# Patient Record
Sex: MALE | Race: WHITE | NOT HISPANIC OR LATINO | Employment: OTHER | URBAN - METROPOLITAN AREA
[De-identification: names, ages, dates, MRNs, and addresses within clinical notes are randomized per-mention and may not be internally consistent; named-entity substitution may affect disease eponyms.]

---

## 2020-12-22 ENCOUNTER — TELEPHONE (OUTPATIENT)
Dept: FAMILY MEDICINE | Facility: CLINIC | Age: 60
End: 2020-12-22

## 2020-12-22 NOTE — TELEPHONE ENCOUNTER
----- Message from Su Christian sent at 12/21/2020  2:18 PM CST -----  Contact: DIAMOND COLIN [[id number]] @ [phone]  Would like to get medical advice.  Symptoms (please be specific):  Pink Dot; Congestion, coughn runnynose   How long has patient had these symptoms:  X 3-4 days / caught it from his son  Pharmacy name and phone # (copy from chart):  [facility]  [phone]      Comments:  Please call him today

## 2020-12-24 ENCOUNTER — OFFICE VISIT (OUTPATIENT)
Dept: FAMILY MEDICINE | Facility: CLINIC | Age: 60
End: 2020-12-24
Payer: MEDICARE

## 2020-12-24 VITALS
DIASTOLIC BLOOD PRESSURE: 68 MMHG | BODY MASS INDEX: 21.23 KG/M2 | TEMPERATURE: 97 F | OXYGEN SATURATION: 98 % | HEIGHT: 74 IN | WEIGHT: 165.38 LBS | HEART RATE: 92 BPM | SYSTOLIC BLOOD PRESSURE: 102 MMHG | RESPIRATION RATE: 18 BRPM

## 2020-12-24 DIAGNOSIS — Z90.81 S/P SPLENECTOMY: ICD-10-CM

## 2020-12-24 DIAGNOSIS — Z98.890 HISTORY OF ILEOSTOMY: ICD-10-CM

## 2020-12-24 DIAGNOSIS — R05.9 COUGH: ICD-10-CM

## 2020-12-24 DIAGNOSIS — K50.119 CROHN'S DISEASE OF COLON WITH COMPLICATION: ICD-10-CM

## 2020-12-24 DIAGNOSIS — J01.10 ACUTE FRONTAL SINUSITIS, RECURRENCE NOT SPECIFIED: Primary | ICD-10-CM

## 2020-12-24 PROBLEM — G47.09 OTHER INSOMNIA: Status: ACTIVE | Noted: 2020-12-24

## 2020-12-24 PROBLEM — K21.9 GERD (GASTROESOPHAGEAL REFLUX DISEASE): Status: ACTIVE | Noted: 2020-12-24

## 2020-12-24 PROBLEM — M54.2 CHRONIC NECK PAIN: Status: ACTIVE | Noted: 2020-12-24

## 2020-12-24 PROBLEM — K44.9 HIATAL HERNIA: Status: ACTIVE | Noted: 2020-12-24

## 2020-12-24 PROBLEM — G89.29 CHRONIC NECK PAIN: Status: ACTIVE | Noted: 2020-12-24

## 2020-12-24 PROCEDURE — 96372 THER/PROPH/DIAG INJ SC/IM: CPT | Mod: S$GLB,,, | Performed by: FAMILY MEDICINE

## 2020-12-24 PROCEDURE — 99203 PR OFFICE/OUTPT VISIT, NEW, LEVL III, 30-44 MIN: ICD-10-PCS | Mod: 25,S$GLB,, | Performed by: FAMILY MEDICINE

## 2020-12-24 PROCEDURE — 96372 PR INJECTION,THERAP/PROPH/DIAG2ST, IM OR SUBCUT: ICD-10-PCS | Mod: S$GLB,,, | Performed by: FAMILY MEDICINE

## 2020-12-24 PROCEDURE — 99203 OFFICE O/P NEW LOW 30 MIN: CPT | Mod: 25,S$GLB,, | Performed by: FAMILY MEDICINE

## 2020-12-24 RX ORDER — BUPRENORPHINE HYDROCHLORIDE 8 MG/1
TABLET SUBLINGUAL
COMMUNITY
Start: 2020-10-27 | End: 2021-06-23

## 2020-12-24 RX ORDER — TIZANIDINE 2 MG/1
1 TABLET ORAL EVERY 6 HOURS PRN
COMMUNITY
Start: 2020-11-30

## 2020-12-24 RX ORDER — AMOXICILLIN AND CLAVULANATE POTASSIUM 875; 125 MG/1; MG/1
1 TABLET, FILM COATED ORAL EVERY 12 HOURS
Qty: 14 TABLET | Refills: 0 | Status: SHIPPED | OUTPATIENT
Start: 2020-12-24 | End: 2020-12-31

## 2020-12-24 RX ORDER — DICLOFENAC SODIUM 10 MG/G
GEL TOPICAL
COMMUNITY
Start: 2020-11-30

## 2020-12-24 RX ORDER — BETAMETHASONE SODIUM PHOSPHATE AND BETAMETHASONE ACETATE 3; 3 MG/ML; MG/ML
6 INJECTION, SUSPENSION INTRA-ARTICULAR; INTRALESIONAL; INTRAMUSCULAR; SOFT TISSUE ONCE
Status: COMPLETED | OUTPATIENT
Start: 2020-12-24 | End: 2020-12-24

## 2020-12-24 RX ORDER — GABAPENTIN 300 MG/1
2 CAPSULE ORAL 3 TIMES DAILY PRN
COMMUNITY
Start: 2020-11-30

## 2020-12-24 RX ORDER — MULTIVIT-MIN/FA/LYCOPEN/LUTEIN .4-300-25
TABLET ORAL
COMMUNITY

## 2020-12-24 RX ADMIN — BETAMETHASONE SODIUM PHOSPHATE AND BETAMETHASONE ACETATE 6 MG: 3; 3 INJECTION, SUSPENSION INTRA-ARTICULAR; INTRALESIONAL; INTRAMUSCULAR; SOFT TISSUE at 12:12

## 2020-12-24 NOTE — PATIENT INSTRUCTIONS
Self-Care for Sinusitis     Drinking plenty of water can help sinuses drain.   Sinusitis can often be managed with self-care. Self-care can keep sinuses moist and make you feel more comfortable. Remember to follow your doctor's instructions closely. This can make a big difference in getting your sinus problem under control.  Drink fluids  Drinking extra fluids helps thin your mucus. This lets it drain from your sinuses more easily. Have a glass of water every hour or two. A humidifier helps in much the same way. Fluids can also offset the drying effects of certain medicines. If you use a humidifier, follow the product maker's instructions on how to use it. Clean it on a regular schedule.  Use saltwater rinses  Rinses help keep your sinuses and nose moist. Mix a teaspoon of salt in 8 ounces of fresh, warm water. Use a bulb syringe to gently squirt the water into your nose a few times a day. You can also buy ready-made saline nasal sprays.  Apply hot or cold packs  Applying heat to the area surrounding your sinuses may make you feel more comfortable. Use a hot water bottle or a hand towel dipped in hot water. Some people also find ice packs effective for relieving pain.  Medicines  Your doctor may prescribe medications to help treat your sinusitis. If you have an infection, antibiotics can help clear it up. If you are prescribed antibiotics, take all pills on schedule until they are gone, even if you feel better. Decongestants help relieve swelling. Use decongestant sprays for short periods only under the direction of your doctor. If you have allergies, your doctor may prescribe medications to help relieve them.   Date Last Reviewed: 10/1/2016  © 5646-5224 [employer]. [address] [place] [address]. All rights reserved. This information is not intended as a substitute for professional medical care. Always follow your healthcare professional's instructions.        Sinusitis, Antibiotic  Treatment (Child)  The sinuses are air-filled spaces in the skull. They are behind the forehead, in the nasal bones and cheeks, and around the eyes. When sinuses are healthy, air moves freely and mucus drains. When a child has a cold or an allergy, the lining of the nose and sinuses can become swollen. Mucus can become trapped. Bacteria may then multiply, causing bacterial sinusitis. This is also called a sinus infection.  Sinusitis often starts with a cold. Cold symptoms usually go away in 5 or 10 days. If sinusitis develops, the symptoms continue and may even get worse. Thick, yellow-green mucus may drain from the nose. Your child may cough more. Your child may also have bad breath that doesnt go away. Other symptoms may include pain or swelling in the face, sore throat, or headache.  The health care provider has prescribed antibiotics to treat the bacterial infection. Symptoms usually get better 2 to 3 days after your child starts the medicine.  Home care  Follow these guidelines when caring for your child at home:  · The health care provider has prescribed an oral antibiotic for your child. This is to help stop the infection. Follow all instructions for giving this medicine to your child. Make sure your child takes the medication every day until it is gone. You should not have any left over. You may also be told to use saline nasal drops or a decongestant.  · If your child has pain, give him or her pain medicine as advised by your childs provider. Don't give your child aspirin unless told to do so. Don't give your child any other medicine without first asking the provider.  · Give your child plenty of time to rest. Try to make your child as comfortable as possible. Some children may be distracted by quiet activities.  · Encourage your child to drink liquids. Toddlers or older children may prefer cold drinks, frozen desserts, or popsicles. They may also like warm chicken soup or beverages with lemon and honey.  Don't give honey to children younger than 1 year old.  · Use a cool-mist humidifier in your childs bedroom to make breathing easier, especially at night. Clean and dry the humidifier to keep bacteria and mold from growing. Dont use using a hot water vaporizer. It can cause burns.  · Dont smoke around your child. Tobacco smoke can make your childs symptoms worse.  Follow-up care  Follow up with your childs healthcare provider, or as directed.  When to seek medical advice  Unless advised otherwise, call your child's healthcare provider if:  · Your child is 3 months old or younger and has a fever of 100.4°F (38°C) or higher. Your child may need to see a healthcare provider.  · Your child is of any age and has fevers higher than 104°F (40°C) that come back again and again.  Call your child's provider right away if your child has any of these:  · Swelling or redness around eyes that lasts all day, not just in the morning  · Vomiting that continues  · Sensitivity to light  · Irritability that gets worse  · Sudden or severe pain in face or head  · Double vision  · Not acting right or not thinking clearly  · Stiff neck  · Breathing problems  · Symptoms not going away in 10 days  Date Last Reviewed: 4/13/2015  © 6214-0791 [employer]. [address] [place] [address], [place] [address]. All rights reserved. This information is not intended as a substitute for professional medical care. Always follow your healthcare professional's instructions.        Acute Sinusitis    Acute sinusitis is irritation and swelling of the sinuses. It is usually caused by a viral infection after a common cold. Your doctor can help you find relief.  What is acute sinusitis?  Sinuses are air-filled spaces in the skull behind the face. They are kept moist and clean by a lining of mucosa. Things such as pollen, smoke, and chemical fumes can irritate the mucosa. It can then swell up. As a response to irritation, the mucosa makes more mucus  and other fluids. Tiny hairlike cilia cover the mucosa. Cilia help carry mucus toward the opening of the sinus. Too much mucus may cause the cilia to stop working. This blocks the sinus opening. A buildup of fluid in the sinuses then causes pain and pressure. It can also encourage bacteria to grow in the sinuses.  Common symptoms of acute sinusitis  You may have:  · Facial soreness pain  · Headache  · Fever  · Fluid draining in the back of the throat (postnasal drip)  · Congestion  · Drainage that is thick and colored, instead of clear  · Cough  Diagnosing acute sinusitis  Your doctor will ask about your symptoms and health history. He or she will look at your ear, nose, and throat. You usually won't need to have X-rays taken.    The doctor may take a sample of mucus to check for bacteria. If you have sinusitis that keeps coming back, you may need imaging tests such as X-rays or CAT scans. This will help your doctor check for a structural problem that may be causing the infection.  Treating acute sinusitis  Treatment is aimed at unblocking the sinus opening and helping the cilia work again. You may need to take antihistamine and decongestant medicine. These can reduce inflammation and decrease the amount of fluid your sinuses make. If you have a bacterial infection, you will need to take antibiotic medicine for 10 to 14 days. Take this medicine until it is gone, even if you feel better.  Date Last Reviewed: 10/1/2016  © 2906-9286 [employer]. [address] [place] [address], [place] [address]. All rights reserved. This information is not intended as a substitute for professional medical care. Always follow your healthcare professional's instructions.

## 2020-12-24 NOTE — PROGRESS NOTES
Subjective:       Patient ID: Bruno Ryan is a 60 y.o. male.    Chief Complaint: Cough, Nasal Congestion, and Sore Throat    This patient is new to me and new to the clinic.  Mr Rivera presents tot he clinic today with complaints of sinus congestion and pain for the last week. Patient reports due to his decreased immune system from his splenectomy he did not want to come in and be exposed to COVID. Patient states he has been trying to treat this at home but now has sinus tenderness and cough at night.   Patient educated on plan of care, verbalized understanding.     Review of Systems   Constitutional: Negative for activity change, appetite change, chills, diaphoresis and fever.   HENT: Positive for postnasal drip, sinus pressure and sore throat. Negative for congestion, ear pain and sneezing.    Eyes: Negative for pain, discharge, redness and itching.   Respiratory: Positive for cough. Negative for apnea, chest tightness, shortness of breath and wheezing.    Cardiovascular: Negative for chest pain and leg swelling.   Gastrointestinal: Negative for abdominal distention, abdominal pain, constipation, diarrhea, nausea and vomiting.   Genitourinary: Negative for difficulty urinating, dysuria, flank pain and frequency.   Skin: Negative for color change, rash and wound.   Neurological: Negative for dizziness.       Patient Active Problem List   Diagnosis    Crohn's disease of colon with complication    GERD (gastroesophageal reflux disease)    Other insomnia    Chronic neck pain    Hiatal hernia    History of ileostomy    S/P splenectomy       Objective:      Physical Exam  Vitals signs reviewed.   Constitutional:       General: He is not in acute distress.     Appearance: Normal appearance. He is well-developed.   HENT:      Head: Normocephalic.      Right Ear: Ear canal and external ear normal. Tympanic membrane is perforated.      Left Ear: Ear canal and external ear normal. A middle ear effusion is present.       Ears:      Comments: Patient has history of perforated TM on right and multiple surgeries.     Nose:      Right Sinus: Maxillary sinus tenderness and frontal sinus tenderness present.      Left Sinus: Maxillary sinus tenderness and frontal sinus tenderness present.      Mouth/Throat:      Lips: Pink.      Mouth: Mucous membranes are moist.      Pharynx: Posterior oropharyngeal erythema present. No oropharyngeal exudate.      Tonsils: 0 on the right. 0 on the left.   Eyes:      Conjunctiva/sclera: Conjunctivae normal.      Pupils: Pupils are equal, round, and reactive to light.   Neck:      Musculoskeletal: Normal range of motion and neck supple.   Cardiovascular:      Rate and Rhythm: Normal rate and regular rhythm.      Heart sounds: Normal heart sounds.   Pulmonary:      Effort: Pulmonary effort is normal. No respiratory distress.      Breath sounds: Normal breath sounds. No stridor. No wheezing, rhonchi or rales.   Skin:     General: Skin is warm and dry.      Findings: No rash.   Neurological:      Mental Status: He is alert and oriented to person, place, and time.   Psychiatric:         Mood and Affect: Mood normal.         Behavior: Behavior normal.         No results found for: WBC, HGB, HCT, PLT, CHOL, TRIG, HDL, LDLDIRECT, ALT, AST, NA, K, CL, CREATININE, BUN, CO2, TSH, PSA, INR, GLUF, HGBA1C, MICROALBUR  The ASCVD Risk score (Gina DC Jr., et al., 2013) failed to calculate for the following reasons:    Cannot find a previous HDL lab    Cannot find a previous total cholesterol lab    Assessment:       1. Acute frontal sinusitis, recurrence not specified    2. Cough    3. History of ileostomy    4. Crohn's disease of colon with complication    5. S/P splenectomy    6. BMI 21.0-21.9, adult        Plan:       Bruno was seen today for cough, nasal congestion and sore throat.    Diagnoses and all orders for this visit:    Acute frontal sinusitis, recurrence not specified  -     amoxicillin-clavulanate  875-125mg (AUGMENTIN) 875-125 mg per tablet; Take 1 tablet by mouth every 12 (twelve) hours. for 7 days  -     betamethasone acetate-betamethasone sodium phosphate injection 6 mg    Cough  -     betamethasone acetate-betamethasone sodium phosphate injection 6 mg    History of ileostomy / Crohn's disease of colon with complication  Stable  Continue medications as prescribed.  Follow up with PCP    S/P splenectomy  Stable  Continue medications as prescribed.  Follow up with PCP    BMI 21.0-21.9  Body mass index is 21.23 kg/m².  Continue healthy diet and regular exercise as tolerated.  Continue medications as prescribed.  Follow up with PCP    Follow up if symptoms worsen or fail to improve.    Dr James Morton (pain management in [place], [place])

## 2021-04-30 ENCOUNTER — OFFICE VISIT (OUTPATIENT)
Dept: URGENT CARE | Facility: CLINIC | Age: 61
End: 2021-04-30
Payer: MEDICARE

## 2021-04-30 VITALS
BODY MASS INDEX: 19.38 KG/M2 | OXYGEN SATURATION: 99 % | HEIGHT: 74 IN | TEMPERATURE: 97 F | DIASTOLIC BLOOD PRESSURE: 73 MMHG | WEIGHT: 151 LBS | HEART RATE: 80 BPM | SYSTOLIC BLOOD PRESSURE: 107 MMHG

## 2021-04-30 DIAGNOSIS — H92.01 OTALGIA, RIGHT: ICD-10-CM

## 2021-04-30 DIAGNOSIS — H66.91 RIGHT OTITIS MEDIA, UNSPECIFIED OTITIS MEDIA TYPE: Primary | ICD-10-CM

## 2021-04-30 PROCEDURE — 99204 OFFICE O/P NEW MOD 45 MIN: CPT | Mod: S$GLB,,, | Performed by: EMERGENCY MEDICINE

## 2021-04-30 PROCEDURE — 99204 PR OFFICE/OUTPT VISIT, NEW, LEVL IV, 45-59 MIN: ICD-10-PCS | Mod: S$GLB,,, | Performed by: EMERGENCY MEDICINE

## 2021-04-30 RX ORDER — NEOMYCIN SULFATE, POLYMYXIN B SULFATE AND HYDROCORTISONE 10; 3.5; 1 MG/ML; MG/ML; [USP'U]/ML
3 SUSPENSION/ DROPS AURICULAR (OTIC) 3 TIMES DAILY
Qty: 10 ML | Refills: 1 | Status: SHIPPED | OUTPATIENT
Start: 2021-04-30 | End: 2021-05-07

## 2021-04-30 RX ORDER — AMOXICILLIN 875 MG/1
875 TABLET, FILM COATED ORAL EVERY 12 HOURS
Qty: 20 TABLET | Refills: 0 | Status: SHIPPED | OUTPATIENT
Start: 2021-04-30 | End: 2021-05-10

## 2021-05-14 ENCOUNTER — OFFICE VISIT (OUTPATIENT)
Dept: FAMILY MEDICINE | Facility: CLINIC | Age: 61
End: 2021-05-14
Payer: MEDICARE

## 2021-05-14 VITALS
HEART RATE: 76 BPM | WEIGHT: 153.19 LBS | HEIGHT: 74 IN | OXYGEN SATURATION: 98 % | BODY MASS INDEX: 19.66 KG/M2 | TEMPERATURE: 98 F | SYSTOLIC BLOOD PRESSURE: 112 MMHG | DIASTOLIC BLOOD PRESSURE: 60 MMHG

## 2021-05-14 DIAGNOSIS — L40.9 PSORIASIS: ICD-10-CM

## 2021-05-14 DIAGNOSIS — H66.004 RECURRENT ACUTE SUPPURATIVE OTITIS MEDIA OF RIGHT EAR WITHOUT SPONTANEOUS RUPTURE OF TYMPANIC MEMBRANE: Primary | ICD-10-CM

## 2021-05-14 PROCEDURE — 99214 OFFICE O/P EST MOD 30 MIN: CPT | Mod: S$GLB,,, | Performed by: FAMILY MEDICINE

## 2021-05-14 PROCEDURE — 99214 PR OFFICE/OUTPT VISIT, EST, LEVL IV, 30-39 MIN: ICD-10-PCS | Mod: S$GLB,,, | Performed by: FAMILY MEDICINE

## 2021-05-14 RX ORDER — TRIAMCINOLONE ACETONIDE 1 MG/G
CREAM TOPICAL 2 TIMES DAILY
Qty: 454 G | Refills: 3 | Status: SHIPPED | OUTPATIENT
Start: 2021-05-14

## 2021-05-14 RX ORDER — AMOXICILLIN AND CLAVULANATE POTASSIUM 875; 125 MG/1; MG/1
1 TABLET, FILM COATED ORAL EVERY 12 HOURS
Qty: 20 TABLET | Refills: 0 | Status: SHIPPED | OUTPATIENT
Start: 2021-05-14 | End: 2021-05-24

## 2021-05-14 RX ORDER — OMEPRAZOLE 20 MG/1
CAPSULE, DELAYED RELEASE ORAL
COMMUNITY
End: 2021-06-23 | Stop reason: SDUPTHER

## 2021-05-14 RX ORDER — PREDNISONE 20 MG/1
TABLET ORAL
Qty: 15 TABLET | Refills: 0 | Status: SHIPPED | OUTPATIENT
Start: 2021-05-14 | End: 2021-05-24

## 2021-06-03 ENCOUNTER — TELEPHONE (OUTPATIENT)
Dept: FAMILY MEDICINE | Facility: CLINIC | Age: 61
End: 2021-06-03

## 2021-06-09 ENCOUNTER — HOSPITAL ENCOUNTER (OUTPATIENT)
Dept: RADIOLOGY | Facility: CLINIC | Age: 61
Discharge: HOME | End: 2021-06-09
Attending: FAMILY MEDICINE
Payer: MEDICARE

## 2021-06-09 ENCOUNTER — OFFICE VISIT (OUTPATIENT)
Dept: FAMILY MEDICINE | Facility: CLINIC | Age: 61
End: 2021-06-09
Payer: MEDICARE

## 2021-06-09 VITALS
OXYGEN SATURATION: 98 % | SYSTOLIC BLOOD PRESSURE: 106 MMHG | WEIGHT: 149.81 LBS | HEIGHT: 74 IN | BODY MASS INDEX: 19.23 KG/M2 | TEMPERATURE: 98 F | DIASTOLIC BLOOD PRESSURE: 64 MMHG | HEART RATE: 70 BPM

## 2021-06-09 DIAGNOSIS — Z72.0 TOBACCO USE: ICD-10-CM

## 2021-06-09 DIAGNOSIS — R06.02 SHORTNESS OF BREATH: ICD-10-CM

## 2021-06-09 DIAGNOSIS — F17.210 SMOKES LESS THAN 1 PACK A DAY WITH GREATER THAN 40 PACK YEAR HISTORY: ICD-10-CM

## 2021-06-09 DIAGNOSIS — L40.9 PSORIASIS: Primary | ICD-10-CM

## 2021-06-09 PROCEDURE — 71046 X-RAY EXAM CHEST 2 VIEWS: CPT | Mod: S$GLB,,, | Performed by: RADIOLOGY

## 2021-06-09 PROCEDURE — 99214 PR OFFICE/OUTPT VISIT, EST, LEVL IV, 30-39 MIN: ICD-10-PCS | Mod: S$GLB,,, | Performed by: FAMILY MEDICINE

## 2021-06-09 PROCEDURE — 99214 OFFICE O/P EST MOD 30 MIN: CPT | Mod: S$GLB,,, | Performed by: FAMILY MEDICINE

## 2021-06-09 PROCEDURE — 71046 XR CHEST PA AND LATERAL: ICD-10-PCS | Mod: S$GLB,,, | Performed by: RADIOLOGY

## 2021-06-09 RX ORDER — ALBUTEROL SULFATE 90 UG/1
2 AEROSOL, METERED RESPIRATORY (INHALATION) EVERY 6 HOURS PRN
Qty: 18 G | Refills: 0 | Status: SHIPPED | OUTPATIENT
Start: 2021-06-09 | End: 2023-07-07 | Stop reason: SDUPTHER

## 2021-06-09 RX ORDER — ASPIRIN 81 MG/1
TABLET ORAL
COMMUNITY
End: 2021-06-23 | Stop reason: SDUPTHER

## 2021-06-11 ENCOUNTER — TELEPHONE (OUTPATIENT)
Dept: FAMILY MEDICINE | Facility: CLINIC | Age: 61
End: 2021-06-11

## 2021-06-15 ENCOUNTER — PROCEDURE VISIT (OUTPATIENT)
Dept: RESPIRATORY THERAPY | Facility: HOSPITAL | Age: 61
End: 2021-06-15
Attending: FAMILY MEDICINE
Payer: MEDICARE

## 2021-06-15 DIAGNOSIS — Z72.0 TOBACCO USE: ICD-10-CM

## 2021-06-15 DIAGNOSIS — R06.02 SHORTNESS OF BREATH: ICD-10-CM

## 2021-06-15 DIAGNOSIS — F17.210 SMOKES LESS THAN 1 PACK A DAY WITH GREATER THAN 40 PACK YEAR HISTORY: ICD-10-CM

## 2021-06-15 PROCEDURE — 25000242 PHARM REV CODE 250 ALT 637 W/ HCPCS: Performed by: FAMILY MEDICINE

## 2021-06-15 PROCEDURE — 94729 DIFFUSING CAPACITY: CPT

## 2021-06-15 PROCEDURE — 94060 EVALUATION OF WHEEZING: CPT

## 2021-06-15 PROCEDURE — 94760 N-INVAS EAR/PLS OXIMETRY 1: CPT

## 2021-06-15 PROCEDURE — 94727 GAS DIL/WSHOT DETER LNG VOL: CPT

## 2021-06-15 RX ORDER — ALBUTEROL SULFATE 0.83 MG/ML
2.5 SOLUTION RESPIRATORY (INHALATION) ONCE
Status: COMPLETED | OUTPATIENT
Start: 2021-06-15 | End: 2021-06-15

## 2021-06-15 RX ADMIN — ALBUTEROL SULFATE 2.5 MG: 2.5 SOLUTION RESPIRATORY (INHALATION) at 01:06

## 2021-06-23 ENCOUNTER — OFFICE VISIT (OUTPATIENT)
Dept: FAMILY MEDICINE | Facility: CLINIC | Age: 61
End: 2021-06-23
Payer: MEDICARE

## 2021-06-23 VITALS
HEART RATE: 75 BPM | SYSTOLIC BLOOD PRESSURE: 104 MMHG | WEIGHT: 160.81 LBS | HEIGHT: 74 IN | OXYGEN SATURATION: 97 % | DIASTOLIC BLOOD PRESSURE: 62 MMHG | BODY MASS INDEX: 20.64 KG/M2 | TEMPERATURE: 97 F

## 2021-06-23 DIAGNOSIS — R06.02 SHORTNESS OF BREATH: ICD-10-CM

## 2021-06-23 DIAGNOSIS — R05.9 COUGH: ICD-10-CM

## 2021-06-23 DIAGNOSIS — F17.210 NICOTINE DEPENDENCE, CIGARETTES, UNCOMPLICATED: ICD-10-CM

## 2021-06-23 DIAGNOSIS — K21.9 GASTROESOPHAGEAL REFLUX DISEASE WITHOUT ESOPHAGITIS: Primary | ICD-10-CM

## 2021-06-23 DIAGNOSIS — J44.9 CHRONIC OBSTRUCTIVE PULMONARY DISEASE, UNSPECIFIED COPD TYPE: ICD-10-CM

## 2021-06-23 PROCEDURE — 99213 PR OFFICE/OUTPT VISIT, EST, LEVL III, 20-29 MIN: ICD-10-PCS | Mod: ,,, | Performed by: FAMILY MEDICINE

## 2021-06-23 PROCEDURE — 99213 OFFICE O/P EST LOW 20 MIN: CPT | Mod: ,,, | Performed by: FAMILY MEDICINE

## 2021-06-23 RX ORDER — BUPRENORPHINE AND NALOXONE 8; 2 MG/1; MG/1
FILM, SOLUBLE BUCCAL; SUBLINGUAL
COMMUNITY
Start: 2021-06-21

## 2021-06-23 RX ORDER — OMEPRAZOLE 20 MG/1
20 CAPSULE, DELAYED RELEASE ORAL DAILY
Qty: 30 CAPSULE | Refills: 11 | Status: SHIPPED | OUTPATIENT
Start: 2021-06-23 | End: 2022-11-16 | Stop reason: SDUPTHER

## 2021-07-09 ENCOUNTER — TELEPHONE (OUTPATIENT)
Dept: FAMILY MEDICINE | Facility: CLINIC | Age: 61
End: 2021-07-09

## 2021-07-16 LAB
BRPFT: NORMAL
BRPFT: NORMAL
DLCO ADJ PRE: 16.87 ML/(MIN*MMHG)
DLCO SINGLE BREATH LLN: 26
DLCO SINGLE BREATH PRE REF: 51.2 %
DLCO SINGLE BREATH REF: 32.93
DLCOC SBVA LLN: 3.09
DLCOC SBVA PRE REF: 66.4 %
DLCOC SBVA REF: 4.15
DLCOC SINGLE BREATH LLN: 26
DLCOC SINGLE BREATH PRE REF: 51.2 %
DLCOC SINGLE BREATH REF: 32.93
DLCOVA LLN: 3.09
DLCOVA PRE REF: 66.4 %
DLCOVA PRE: 2.75 ML/(MIN*MMHG*L)
DLCOVA REF: 4.15
DLVAADJ PRE: 2.75 ML/(MIN*MMHG*L)
ERVN2 LLN: -16448.68
ERVN2 PRE REF: 104.4 %
ERVN2 PRE: 1.38 L
ERVN2 REF: 1.32
FEF 25 75 CHG: 24.1 %
FEF 25 75 LLN: 1.68
FEF 25 75 POST REF: 80.2 %
FEF 25 75 PRE REF: 64.6 %
FEF 25 75 REF: 3.36
FET100 CHG: -19.8 %
FEV1 CHG: 1.2 %
FEV1 FVC CHG: 4.3 %
FEV1 FVC LLN: 65
FEV1 FVC POST REF: 95 %
FEV1 FVC PRE REF: 91.1 %
FEV1 FVC REF: 77
FEV1 LLN: 3.12
FEV1 POST REF: 85.6 %
FEV1 PRE REF: 84.6 %
FEV1 REF: 4.11
FRCN2 LLN: 2.84
FRCN2 PRE REF: 89.7 %
FRCN2 REF: 3.83
FVC CHG: -3 %
FVC LLN: 4.1
FVC POST REF: 89.7 %
FVC PRE REF: 92.5 %
FVC REF: 5.36
IVC PRE: 4.61 L
IVC SINGLE BREATH LLN: 4.1
IVC SINGLE BREATH PRE REF: 85.9 %
IVC SINGLE BREATH REF: 5.36
MVV LLN: 133
MVV PRE REF: 86.3 %
MVV REF: 156
PEF CHG: -12.7 %
PEF LLN: 7.71
PEF POST REF: 64.4 %
PEF PRE REF: 73.8 %
PEF REF: 10.3
POST FEF 25 75: 2.7 L/S
POST FET 100: 11.18 SEC
POST FEV1 FVC: 73.12 %
POST FEV1: 3.52 L
POST FVC: 4.81 L
POST PEF: 6.63 L/S
PRE DLCO: 16.87 ML/(MIN*MMHG)
PRE FEF 25 75: 2.17 L/S
PRE FET 100: 13.94 SEC
PRE FEV1 FVC: 70.1 %
PRE FEV1: 3.48 L
PRE FRC N2: 3.44 L
PRE FVC: 4.96 L
PRE MVV: 135 L/MIN
PRE PEF: 7.6 L/S
RVN2 LLN: 1.83
RVN2 PRE REF: 71.9 %
RVN2 PRE: 1.8 L
RVN2 REF: 2.51
RVN2TLCN2 LLN: 27.6
RVN2TLCN2 PRE REF: 72.9 %
RVN2TLCN2 PRE: 26.68 %
RVN2TLCN2 REF: 36.58
TLCN2 LLN: 6.79
TLCN2 PRE REF: 85.2 %
TLCN2 PRE: 6.76 L
TLCN2 REF: 7.94
VA PRE: 6.13 L
VA SINGLE BREATH LLN: 7.79
VA SINGLE BREATH PRE REF: 78.7 %
VA SINGLE BREATH REF: 7.79
VCMAXN2 LLN: 4.1
VCMAXN2 PRE REF: 92.5 %
VCMAXN2 PRE: 4.96 L
VCMAXN2 REF: 5.36

## 2021-09-22 ENCOUNTER — OFFICE VISIT (OUTPATIENT)
Dept: FAMILY MEDICINE | Facility: CLINIC | Age: 61
End: 2021-09-22
Payer: MEDICARE

## 2021-09-22 DIAGNOSIS — Z20.822 EXPOSURE TO COVID-19 VIRUS: ICD-10-CM

## 2021-09-22 DIAGNOSIS — R68.83 CHILLS: ICD-10-CM

## 2021-09-22 DIAGNOSIS — R50.9 FEVER, UNSPECIFIED FEVER CAUSE: Primary | ICD-10-CM

## 2021-09-22 DIAGNOSIS — R43.0 LOSS OF SMELL: ICD-10-CM

## 2021-09-22 DIAGNOSIS — R06.02 SHORTNESS OF BREATH: ICD-10-CM

## 2021-09-22 DIAGNOSIS — R05.9 COUGH: ICD-10-CM

## 2021-09-22 PROCEDURE — 99213 PR OFFICE/OUTPT VISIT, EST, LEVL III, 20-29 MIN: ICD-10-PCS | Mod: 95,,, | Performed by: FAMILY MEDICINE

## 2021-09-22 PROCEDURE — 99213 OFFICE O/P EST LOW 20 MIN: CPT | Mod: 95,,, | Performed by: FAMILY MEDICINE

## 2021-09-23 ENCOUNTER — TELEPHONE (OUTPATIENT)
Dept: FAMILY MEDICINE | Facility: CLINIC | Age: 61
End: 2021-09-23

## 2021-09-23 ENCOUNTER — CLINICAL SUPPORT (OUTPATIENT)
Dept: FAMILY MEDICINE | Facility: CLINIC | Age: 61
End: 2021-09-23
Payer: MEDICARE

## 2021-09-23 DIAGNOSIS — R68.83 CHILLS: ICD-10-CM

## 2021-09-23 DIAGNOSIS — R05.9 COUGH: ICD-10-CM

## 2021-09-23 DIAGNOSIS — U07.1 COVID-19: Primary | ICD-10-CM

## 2021-09-23 DIAGNOSIS — Z20.822 EXPOSURE TO COVID-19 VIRUS: ICD-10-CM

## 2021-09-23 DIAGNOSIS — R50.9 FEVER, UNSPECIFIED FEVER CAUSE: ICD-10-CM

## 2021-09-23 LAB
CTP QC/QA: YES
SARS-COV-2 RDRP RESP QL NAA+PROBE: POSITIVE

## 2021-09-23 PROCEDURE — U0005 INFEC AGEN DETEC AMPLI PROBE: HCPCS | Performed by: FAMILY MEDICINE

## 2021-09-23 PROCEDURE — U0003 INFECTIOUS AGENT DETECTION BY NUCLEIC ACID (DNA OR RNA); SEVERE ACUTE RESPIRATORY SYNDROME CORONAVIRUS 2 (SARS-COV-2) (CORONAVIRUS DISEASE [COVID-19]), AMPLIFIED PROBE TECHNIQUE, MAKING USE OF HIGH THROUGHPUT TECHNOLOGIES AS DESCRIBED BY CMS-2020-01-R: HCPCS | Performed by: FAMILY MEDICINE

## 2021-09-23 PROCEDURE — U0002: ICD-10-PCS | Mod: QW,CR,S$GLB, | Performed by: FAMILY MEDICINE

## 2021-09-23 PROCEDURE — U0002 COVID-19 LAB TEST NON-CDC: HCPCS | Mod: QW,CR,S$GLB, | Performed by: FAMILY MEDICINE

## 2021-09-24 ENCOUNTER — TELEPHONE (OUTPATIENT)
Dept: FAMILY MEDICINE | Facility: CLINIC | Age: 61
End: 2021-09-24

## 2021-09-24 LAB
SARS-COV-2 RNA RESP QL NAA+PROBE: DETECTED
SARS-COV-2- CYCLE NUMBER: 33

## 2021-11-03 ENCOUNTER — OFFICE VISIT (OUTPATIENT)
Dept: FAMILY MEDICINE | Facility: CLINIC | Age: 61
End: 2021-11-03
Payer: MEDICARE

## 2021-11-03 ENCOUNTER — TELEPHONE (OUTPATIENT)
Dept: FAMILY MEDICINE | Facility: CLINIC | Age: 61
End: 2021-11-03

## 2021-11-03 VITALS
WEIGHT: 153 LBS | DIASTOLIC BLOOD PRESSURE: 62 MMHG | HEART RATE: 86 BPM | HEIGHT: 74 IN | SYSTOLIC BLOOD PRESSURE: 130 MMHG | BODY MASS INDEX: 19.64 KG/M2 | OXYGEN SATURATION: 97 % | TEMPERATURE: 98 F

## 2021-11-03 DIAGNOSIS — R91.1 SOLITARY PULMONARY NODULE: Primary | ICD-10-CM

## 2021-11-03 PROCEDURE — 99213 OFFICE O/P EST LOW 20 MIN: CPT | Mod: S$GLB,,, | Performed by: FAMILY MEDICINE

## 2021-11-03 PROCEDURE — G0008 ADMIN INFLUENZA VIRUS VAC: HCPCS | Mod: S$GLB,,, | Performed by: FAMILY MEDICINE

## 2021-11-03 PROCEDURE — 99213 PR OFFICE/OUTPT VISIT, EST, LEVL III, 20-29 MIN: ICD-10-PCS | Mod: S$GLB,,, | Performed by: FAMILY MEDICINE

## 2021-11-03 PROCEDURE — 90686 FLU VACCINE (QUAD) GREATER THAN OR EQUAL TO 3YO PRESERVATIVE FREE IM: ICD-10-PCS | Mod: S$GLB,,, | Performed by: FAMILY MEDICINE

## 2021-11-03 PROCEDURE — 90686 IIV4 VACC NO PRSV 0.5 ML IM: CPT | Mod: S$GLB,,, | Performed by: FAMILY MEDICINE

## 2021-11-03 PROCEDURE — G0008 FLU VACCINE (QUAD) GREATER THAN OR EQUAL TO 3YO PRESERVATIVE FREE IM: ICD-10-PCS | Mod: S$GLB,,, | Performed by: FAMILY MEDICINE

## 2021-11-03 RX ORDER — PROMETHAZINE HYDROCHLORIDE 25 MG/1
25 TABLET ORAL EVERY 4 HOURS PRN
Qty: 90 TABLET | Refills: 2 | Status: SHIPPED | OUTPATIENT
Start: 2021-11-03 | End: 2022-11-16 | Stop reason: SDUPTHER

## 2022-01-19 ENCOUNTER — TELEPHONE (OUTPATIENT)
Dept: FAMILY MEDICINE | Facility: CLINIC | Age: 62
End: 2022-01-19
Payer: MEDICARE

## 2022-01-19 ENCOUNTER — LAB VISIT (OUTPATIENT)
Dept: FAMILY MEDICINE | Facility: CLINIC | Age: 62
End: 2022-01-19
Payer: MEDICARE

## 2022-01-19 DIAGNOSIS — Z20.822 CONTACT WITH AND (SUSPECTED) EXPOSURE TO COVID-19: Primary | ICD-10-CM

## 2022-01-19 PROCEDURE — U0005 INFEC AGEN DETEC AMPLI PROBE: HCPCS | Performed by: FAMILY MEDICINE

## 2022-01-19 PROCEDURE — U0003 INFECTIOUS AGENT DETECTION BY NUCLEIC ACID (DNA OR RNA); SEVERE ACUTE RESPIRATORY SYNDROME CORONAVIRUS 2 (SARS-COV-2) (CORONAVIRUS DISEASE [COVID-19]), AMPLIFIED PROBE TECHNIQUE, MAKING USE OF HIGH THROUGHPUT TECHNOLOGIES AS DESCRIBED BY CMS-2020-01-R: HCPCS | Performed by: FAMILY MEDICINE

## 2022-01-19 NOTE — PROGRESS NOTES
Bruno Ryan presented to clinic for COVID-19 swab.   Bruno Ryan verified x2, name and .   Bruno Ryan instructed on what will be completed, asked if ever had COVID-19 swab.   Explained procedure to Bruno Ryan.   Specimen obtained.   No questions or concerns voiced further at this time.   Bruno Ryan left in satisfactory condition.

## 2022-01-20 LAB
SARS-COV-2 RNA RESP QL NAA+PROBE: NOT DETECTED
SARS-COV-2- CYCLE NUMBER: NORMAL

## 2022-01-25 ENCOUNTER — TELEPHONE (OUTPATIENT)
Dept: FAMILY MEDICINE | Facility: CLINIC | Age: 62
End: 2022-01-25
Payer: MEDICARE

## 2022-02-11 ENCOUNTER — TELEPHONE (OUTPATIENT)
Dept: FAMILY MEDICINE | Facility: CLINIC | Age: 62
End: 2022-02-11
Payer: MEDICARE

## 2022-02-11 NOTE — TELEPHONE ENCOUNTER
Called pt to r/s appt with dr parikh,he was due in jan. For repeat Ct chest,faxed order to [facility] via [employer] e-fax.

## 2022-02-17 ENCOUNTER — TELEPHONE (OUTPATIENT)
Dept: FAMILY MEDICINE | Facility: CLINIC | Age: 62
End: 2022-02-17
Payer: MEDICARE

## 2022-02-17 NOTE — TELEPHONE ENCOUNTER
----- Message from Ml Ferrer sent at 2/17/2022  4:14 PM CST -----  Contact: pt  Type: Needs Medical Advice    Who Called: pt    Best Call Back Number: [phone]    Inquiry/Question: pt would like an appt possibly tomorrow 2/18 states he's had a sinus infection x couple weeks despite OTC meds.   Please call to advise/ schedule       Thank you~

## 2022-03-03 ENCOUNTER — TELEPHONE (OUTPATIENT)
Dept: FAMILY MEDICINE | Facility: CLINIC | Age: 62
End: 2022-03-03
Payer: MEDICARE

## 2022-03-03 DIAGNOSIS — R91.1 SOLITARY PULMONARY NODULE: ICD-10-CM

## 2022-05-11 ENCOUNTER — TELEPHONE (OUTPATIENT)
Dept: FAMILY MEDICINE | Facility: CLINIC | Age: 62
End: 2022-05-11
Payer: MEDICARE

## 2022-05-11 NOTE — TELEPHONE ENCOUNTER
Called patient regarding recently missed visit scheduled for 5/4/22 - no answer; left VM requesting phone call back so may further discuss rescheduling. Will also send ezeep message regarding this.

## 2022-06-16 ENCOUNTER — TELEPHONE (OUTPATIENT)
Dept: FAMILY MEDICINE | Facility: CLINIC | Age: 62
End: 2022-06-16
Payer: MEDICARE

## 2022-06-16 NOTE — TELEPHONE ENCOUNTER
Left message for pt to call the office back about getting appointment          ----- Message from Abi Muniz sent at 6/16/2022  4:53 PM CDT -----  Contact: pt at  [phone]  Type:  Sooner Appointment Request    Caller is requesting a sooner appointment.  Caller declined first available appointment listed below.  Caller will not accept being placed on the waitlist and is requesting a message be sent to doctor.    Name of Caller:  pt  When is the first available appointment?  8/3/22  Symptoms:  f/u  Best Call Back Number:  [phone]  Additional Information:  pt would like to be seen Monday. Please call back and advise.

## 2022-06-22 ENCOUNTER — TELEPHONE (OUTPATIENT)
Dept: FAMILY MEDICINE | Facility: CLINIC | Age: 62
End: 2022-06-22
Payer: MEDICARE

## 2022-06-22 NOTE — TELEPHONE ENCOUNTER
----- Message from Ann Cortez sent at 6/22/2022  3:53 PM CDT -----  Contact: patient  Type:  Patient Returning Call    Who Called:  patient  Who Left Message for Patient:  Jill  Does the patient know what this is regarding?:  appt  Best Call Back Number:  [phone]   Additional Information:

## 2022-08-31 DIAGNOSIS — Z11.59 NEED FOR HEPATITIS C SCREENING TEST: ICD-10-CM

## 2022-09-15 ENCOUNTER — PATIENT MESSAGE (OUTPATIENT)
Dept: ADMINISTRATIVE | Facility: HOSPITAL | Age: 62
End: 2022-09-15
Payer: MEDICARE

## 2022-11-16 ENCOUNTER — OFFICE VISIT (OUTPATIENT)
Dept: FAMILY MEDICINE | Facility: CLINIC | Age: 62
End: 2022-11-16
Payer: MEDICARE

## 2022-11-16 VITALS — HEIGHT: 74 IN | BODY MASS INDEX: 18.82 KG/M2 | WEIGHT: 146.63 LBS

## 2022-11-16 DIAGNOSIS — K21.9 GASTROESOPHAGEAL REFLUX DISEASE WITHOUT ESOPHAGITIS: ICD-10-CM

## 2022-11-16 DIAGNOSIS — N40.0 BENIGN PROSTATIC HYPERPLASIA, UNSPECIFIED WHETHER LOWER URINARY TRACT SYMPTOMS PRESENT: ICD-10-CM

## 2022-11-16 DIAGNOSIS — R11.0 NAUSEA: ICD-10-CM

## 2022-11-16 DIAGNOSIS — R91.8 OTHER NONSPECIFIC ABNORMAL FINDING OF LUNG FIELD: ICD-10-CM

## 2022-11-16 DIAGNOSIS — Z13.220 LIPID SCREENING: ICD-10-CM

## 2022-11-16 DIAGNOSIS — R91.1 PULMONARY NODULE: ICD-10-CM

## 2022-11-16 DIAGNOSIS — Z87.448: ICD-10-CM

## 2022-11-16 DIAGNOSIS — R39.198 DIFFICULTY URINATING: ICD-10-CM

## 2022-11-16 DIAGNOSIS — K50.119 CROHN'S DISEASE OF COLON WITH COMPLICATION: ICD-10-CM

## 2022-11-16 DIAGNOSIS — Z00.00 ENCOUNTER FOR MEDICAL EXAMINATION TO ESTABLISH CARE: ICD-10-CM

## 2022-11-16 DIAGNOSIS — F17.200 SMOKER: ICD-10-CM

## 2022-11-16 DIAGNOSIS — N40.0 ENLARGED PROSTATE: ICD-10-CM

## 2022-11-16 DIAGNOSIS — R53.83 FATIGUE, UNSPECIFIED TYPE: ICD-10-CM

## 2022-11-16 DIAGNOSIS — R91.1 SOLITARY PULMONARY NODULE: Primary | ICD-10-CM

## 2022-11-16 PROCEDURE — 99999 PR PBB SHADOW E&M-EST. PATIENT-LVL III: CPT | Mod: PBBFAC,,, | Performed by: FAMILY MEDICINE

## 2022-11-16 PROCEDURE — 99999 PR PBB SHADOW E&M-EST. PATIENT-LVL III: ICD-10-PCS | Mod: PBBFAC,,, | Performed by: FAMILY MEDICINE

## 2022-11-16 PROCEDURE — 99213 PR OFFICE/OUTPT VISIT, EST, LEVL III, 20-29 MIN: ICD-10-PCS | Mod: S$PBB,,, | Performed by: FAMILY MEDICINE

## 2022-11-16 PROCEDURE — 99213 OFFICE O/P EST LOW 20 MIN: CPT | Mod: PBBFAC,PN,25 | Performed by: FAMILY MEDICINE

## 2022-11-16 PROCEDURE — 99213 OFFICE O/P EST LOW 20 MIN: CPT | Mod: S$PBB,,, | Performed by: FAMILY MEDICINE

## 2022-11-16 PROCEDURE — G0008 ADMIN INFLUENZA VIRUS VAC: HCPCS | Mod: PBBFAC,PN

## 2022-11-16 RX ORDER — CLONAZEPAM 1 MG/1
1 TABLET ORAL NIGHTLY PRN
COMMUNITY
Start: 2022-11-01

## 2022-11-16 RX ORDER — IBUPROFEN 800 MG/1
TABLET ORAL DAILY PRN
COMMUNITY
Start: 2022-11-01

## 2022-11-16 RX ORDER — PROMETHAZINE HYDROCHLORIDE 25 MG/1
25 TABLET ORAL EVERY 4 HOURS PRN
Qty: 90 TABLET | Refills: 2 | Status: SHIPPED | OUTPATIENT
Start: 2022-11-16 | End: 2023-02-09

## 2022-11-16 RX ORDER — OMEPRAZOLE 20 MG/1
20 CAPSULE, DELAYED RELEASE ORAL DAILY
Qty: 90 CAPSULE | Refills: 3 | Status: SHIPPED | OUTPATIENT
Start: 2022-11-16 | End: 2023-07-07 | Stop reason: SDUPTHER

## 2022-11-16 NOTE — PROGRESS NOTES
"Subjective:       Patient ID: Bruno Ryan is a 62 y.o. male.    Chief Complaint: Follow-up and Flu Vaccine (Patient would like the flu vaccine administered on today. )    HPI  Review of Systems    Patient Active Problem List   Diagnosis    Crohn's disease of colon with complication    GERD (gastroesophageal reflux disease)    Other insomnia    Chronic neck pain    Hiatal hernia    History of ileostomy    S/P splenectomy    Shortness of breath    Pulmonary nodule       Objective:      Physical Exam  Vitals and nursing note reviewed.   Constitutional:       Appearance: Normal appearance. He is normal weight.   HENT:      Head: Normocephalic.      Nose: Nose normal.   Eyes:      Extraocular Movements: Extraocular movements intact.      Conjunctiva/sclera: Conjunctivae normal.      Pupils: Pupils are equal, round, and reactive to light.   Cardiovascular:      Rate and Rhythm: Normal rate and regular rhythm.      Heart sounds: Normal heart sounds. No murmur heard.  Pulmonary:      Effort: Pulmonary effort is normal. No respiratory distress.      Breath sounds: Normal breath sounds.      Comments: Distant sounds due to copd  Musculoskeletal:         General: Normal range of motion.      Cervical back: Normal range of motion and neck supple.   Skin:     General: Skin is warm and dry.   Neurological:      General: No focal deficit present.      Mental Status: He is alert and oriented to person, place, and time.   Psychiatric:         Mood and Affect: Mood normal.         Behavior: Behavior normal.       No results found for: WBC, HGB, HCT, PLT, CHOL, TRIG, HDL, LDLDIRECT, ALT, AST, NA, K, CL, CREATININE, BUN, CO2, TSH, PSA, INR, GLUF, HGBA1C, MICROALBUR  The ASCVD Risk score (Ochoa DK, et al., 2019) failed to calculate for the following reasons:    The systolic blood pressure is missing    Cannot find a previous HDL lab    Cannot find a previous total cholesterol lab  Visit Vitals  Ht 6' 2" (1.88 m)   Wt 66.5 kg (146 " lb 9.7 oz)   BMI 18.82 kg/m²      Assessment:       1. Solitary pulmonary nodule    2. Nausea and vomiting during pregnancy prior to 22 weeks gestation    3. Gastroesophageal reflux disease without esophagitis    4. Other nonspecific abnormal finding of lung field    5. Crohn's disease of colon with complication    6. Pulmonary nodule    7. Benign prostatic hyperplasia, unspecified whether lower urinary tract symptoms present    8. Encounter for medical examination to establish care    9. Lipid screening    10. Fatigue, unspecified type    11. H/O renal insufficiency syndrome    12. Smoker    13. Enlarged prostate    14. Difficulty urinating          Plan:       1. Solitary pulmonary nodule    2. Nausea   -     promethazine (PHENERGAN) 25 MG tablet; Take 1 tablet (25 mg total) by mouth every 4 (four) hours as needed for Nausea.  Dispense: 90 tablet; Refill: 2    3. Gastroesophageal reflux disease without esophagitis  -     omeprazole (PRILOSEC) 20 MG capsule; Take 1 capsule (20 mg total) by mouth once daily.  Dispense: 90 capsule; Refill: 3    4. Other nonspecific abnormal finding of lung field    5. Crohn's disease of colon with complication  Assessment & Plan:  Continue current care, repeat CT on 3/3/2023      6. Pulmonary nodule    7. Benign prostatic hyperplasia, unspecified whether lower urinary tract symptoms present  -     Ambulatory referral/consult to Urology; Future; Expected date: 11/23/2022    8. Encounter for medical examination to establish care  -     TSH; Future; Expected date: 11/16/2022  -     Microalbumin/Creatinine Ratio, Urine  -     CBC Auto Differential; Future; Expected date: 11/16/2022  -     Comprehensive Metabolic Panel; Future; Expected date: 11/16/2022    9. Lipid screening    10. Fatigue, unspecified type  -     TSH; Future; Expected date: 11/16/2022  -     CBC Auto Differential; Future; Expected date: 11/16/2022    11. H/O renal insufficiency syndrome  -     Microalbumin/Creatinine  Ratio, Urine  -     Comprehensive Metabolic Panel; Future; Expected date: 11/16/2022    12. Smoker    13. Enlarged prostate  -     Ambulatory referral/consult to Urology; Future; Expected date: 11/23/2022    14. Difficulty urinating  -     Ambulatory referral/consult to Urology; Future; Expected date: 11/23/2022     No follow-ups on file.

## 2022-11-16 NOTE — PROGRESS NOTES
"Subjective:       Patient ID: Bruno Ryan is a 62 y.o. male.    Chief Complaint: Follow-up and Flu Vaccine (Patient would like the flu vaccine administered on today. )    Mr Ryan is here for follow up pulmonary nodule. CT scheduled for 3/3/2023 to reassess nodule. He also needs refills on prilosec and phenergan.  He also complains that he cannot urinate as he should do to having a colostomy. He also has borderline renal insufficiency. He also has likely BPH. He requests a urology referral        Follow-up    Review of Systems    Patient Active Problem List   Diagnosis    Crohn's disease of colon with complication    GERD (gastroesophageal reflux disease)    Other insomnia    Chronic neck pain    Hiatal hernia    History of ileostomy    S/P splenectomy    Shortness of breath       Objective:      Physical Exam    No results found for: WBC, HGB, HCT, PLT, CHOL, TRIG, HDL, LDLDIRECT, ALT, AST, NA, K, CL, CREATININE, BUN, CO2, TSH, PSA, INR, GLUF, HGBA1C, MICROALBUR  The ASCVD Risk score (Ochoa ROSADO, et al., 2019) failed to calculate for the following reasons:    The systolic blood pressure is missing    Cannot find a previous HDL lab    Cannot find a previous total cholesterol lab  Visit Vitals  Ht 6' 2" (1.88 m)   Wt 66.5 kg (146 lb 9.7 oz)   BMI 18.82 kg/m²      Assessment:       1. Solitary pulmonary nodule    2. Nausea and vomiting during pregnancy prior to 22 weeks gestation    3. Gastroesophageal reflux disease without esophagitis    4. Other nonspecific abnormal finding of lung field          Plan:       1. Solitary pulmonary nodule    2. Nausea and vomiting during pregnancy prior to 22 weeks gestation  -     promethazine (PHENERGAN) 25 MG tablet; Take 1 tablet (25 mg total) by mouth every 4 (four) hours as needed for Nausea.  Dispense: 90 tablet; Refill: 2    3. Gastroesophageal reflux disease without esophagitis  -     omeprazole (PRILOSEC) 20 MG capsule; Take 1 capsule (20 mg total) by mouth once daily.  " Dispense: 90 capsule; Refill: 3    4. Other nonspecific abnormal finding of lung field     No follow-ups on file.

## 2023-02-09 ENCOUNTER — TELEPHONE (OUTPATIENT)
Dept: FAMILY MEDICINE | Facility: CLINIC | Age: 63
End: 2023-02-09
Payer: MEDICARE

## 2023-02-09 RX ORDER — PROMETHAZINE HYDROCHLORIDE 25 MG/1
25 TABLET ORAL EVERY 4 HOURS PRN
Qty: 90 TABLET | Refills: 2 | Status: SHIPPED | OUTPATIENT
Start: 2023-02-09 | End: 2023-07-07 | Stop reason: SDUPTHER

## 2023-02-09 NOTE — TELEPHONE ENCOUNTER
Returned call,they need OV notes from November faxed to them stating pt has Crohns for his ostomy supplies to get paid.      ----- Message from Alina Whitlock sent at 2/9/2023  3:50 PM CST -----  Contact: pharm  Type: Needs Medical Advice         Who Called:[employer], [employer]  Best Call Back Number: [phone]  Additional Information: Requesting a call back regarding  They are needing records for a Audit for his Ostomy supplies. They need the office to call them.    Please Advise- Thank you

## 2023-04-11 ENCOUNTER — PATIENT MESSAGE (OUTPATIENT)
Dept: ADMINISTRATIVE | Facility: HOSPITAL | Age: 63
End: 2023-04-11
Payer: MEDICARE

## 2023-05-05 ENCOUNTER — TELEPHONE (OUTPATIENT)
Dept: FAMILY MEDICINE | Facility: CLINIC | Age: 63
End: 2023-05-05
Payer: MEDICARE

## 2023-05-05 NOTE — TELEPHONE ENCOUNTER
Called [facility], informed pharmacy patient has appointment 05/23/23 with Blanca Hardy, we will then need to fax paperwork to them for ostomy supplies

## 2023-05-05 NOTE — TELEPHONE ENCOUNTER
Informed patient we received refill request for his ostomy supplies, he has not been to the clinic sine 11/2022, we sent those clinical notes to pharmacy in February, made appointment with Blanca for follow up

## 2023-05-25 ENCOUNTER — PATIENT MESSAGE (OUTPATIENT)
Dept: ADMINISTRATIVE | Facility: HOSPITAL | Age: 63
End: 2023-05-25
Payer: MEDICARE

## 2023-06-19 ENCOUNTER — PATIENT OUTREACH (OUTPATIENT)
Dept: ADMINISTRATIVE | Facility: HOSPITAL | Age: 63
End: 2023-06-19
Payer: MEDICARE

## 2023-06-19 NOTE — PROGRESS NOTES
Population Health Chart Review & Patient Outreach Details:     Reason for Outreach Encounter:     []  Non-Compliant Report   [x]  Payor Report ([employer], PHN, BCBS, MSSP, MCIP, UHC, etc.)   []  Pre-Visit Chart Review     Updates Requested / Reviewed:     [x]  Care Everywhere    []     []  External Sources ([facility], [facility], DIS, etc.)   []  Care Team Updated    Patient Outreach Method:    [x]  Telephone Outreach Completed   [] Successful   [] Left Voicemail   [x] Unable to Contact (wrong number, no voicemail)  []  [employer] [facility] Outreach Sent  []  Letter Outreach Mailed  []  Fax Sent for External Records  []  External Records Upload    Health Maintenance Topics Addressed and Outreach Outcomes / Actions Taken:        []      Breast Cancer Screening []  Mammo Scheduled      []  External Records Requested     []  Added Reminder to Complete to Upcoming Primary Care Appt Notes     []  Patient Declined     []  Patient Will Call Back to Schedule     []  Patient Will Schedule with External Provider / Order Routed if Applicable             []       Cervical Cancer Screening []  Pap Scheduled      []  External Records Requested     []  Added Reminder to Complete to Upcoming Primary Care Appt Notes     []  Patient Declined     []  Patient Will Call Back to Schedule     []  Patient Will Schedule with External Provider               []          Colorectal Cancer Screening []  Colonoscopy Case Request or Referral Placed     []  External Records Requested     []  Added Reminder to Complete to Upcoming Primary Care Appt Notes     []  Patient Declined     []  Patient Will Call Back to Schedule     []  Patient Will Schedule with External Provider     []  Fit Kit Mailed (add the SmartPhrase under additional notes)     []  Reminded Patient to Complete Home Test             []      Diabetic Eye Exam []  Eye Camera Scheduled or Optometry Referral Placed     []  External Records Requested     []  Added Reminder to Complete to  Upcoming Primary Care Appt Notes     []  Patient Declined     []  Patient Will Call Back to Schedule     []  Patient Will Schedule with External Provider             []      Blood Pressure Control []  Primary Care Follow Up Visit Scheduled     []  Remote Blood Pressure Reading Captured     []  Added Reminder to Complete to Upcoming Primary Care Appt Notes     []  Patient Declined     []  Patient Will Call Back / Patient Will Send Portal Message with Reading     []  Patient Will Call Back to Schedule Provider Visit             []       HbA1c & Other Labs []  Lab Appt Scheduled for Due Labs     []  Primary Care Follow Up Visit Scheduled      []  Reminded Patient to Complete Home Test     []  Added Reminder to Complete to Upcoming Primary Care Appt Notes     []  Patient Declined     []  Patient Will Call Back to Schedule     []  Patient Will Schedule with External Provider / Order Routed if Applicable           []    Schedule Primary Care Appt []  Primary Care Appt Scheduled     []  Patient Declined     []  Patient Will Call Back to Schedule     []  Pt Established with External Provider & Updated Care Team             []      Medication Adherence []  Primary Care Appointment Scheduled     []  Added Reminder to Upcoming Primary Care Appt Notes     []  Patient Reminded to  Prescription     []  Patient Declined, Provider Notified if Needed     []  Sent Provider Message to Review and/or Add Exclusion to Problem List             []      Osteoporosis Screening []  DXA Appointment Scheduled     []  External Records Requested     []  Added Reminder to Complete to Upcoming Primary Care Appt Notes     []  Patient Declined     []  Patient Will Call Back to Schedule     []  Patient Will Schedule with External Provider / Order Routed if Applicable     Additional Care Coordinator Notes:  Working MSSP not seen in 2023 report       Further Action Needed If Patient Returns Outreach:

## 2023-07-07 ENCOUNTER — OFFICE VISIT (OUTPATIENT)
Dept: FAMILY MEDICINE | Facility: CLINIC | Age: 63
End: 2023-07-07
Payer: MEDICARE

## 2023-07-07 VITALS
RESPIRATION RATE: 12 BRPM | HEART RATE: 84 BPM | HEIGHT: 74 IN | OXYGEN SATURATION: 96 % | WEIGHT: 132.69 LBS | SYSTOLIC BLOOD PRESSURE: 118 MMHG | TEMPERATURE: 98 F | DIASTOLIC BLOOD PRESSURE: 78 MMHG | BODY MASS INDEX: 17.03 KG/M2

## 2023-07-07 DIAGNOSIS — K21.9 GASTROESOPHAGEAL REFLUX DISEASE WITHOUT ESOPHAGITIS: ICD-10-CM

## 2023-07-07 DIAGNOSIS — R11.0 NAUSEA: ICD-10-CM

## 2023-07-07 DIAGNOSIS — Z12.5 SCREENING FOR PROSTATE CANCER: ICD-10-CM

## 2023-07-07 DIAGNOSIS — R79.9 ABNORMAL FINDING OF BLOOD CHEMISTRY, UNSPECIFIED: ICD-10-CM

## 2023-07-07 DIAGNOSIS — R91.1 SOLITARY PULMONARY NODULE: Primary | ICD-10-CM

## 2023-07-07 DIAGNOSIS — Z13.29 THYROID DISORDER SCREEN: ICD-10-CM

## 2023-07-07 DIAGNOSIS — F17.210 SMOKES LESS THAN 1 PACK A DAY WITH GREATER THAN 40 PACK YEAR HISTORY: ICD-10-CM

## 2023-07-07 DIAGNOSIS — R06.02 SHORTNESS OF BREATH: ICD-10-CM

## 2023-07-07 DIAGNOSIS — Z00.00 ENCOUNTER FOR MEDICAL EXAMINATION TO ESTABLISH CARE: ICD-10-CM

## 2023-07-07 DIAGNOSIS — Z72.0 TOBACCO USE: ICD-10-CM

## 2023-07-07 DIAGNOSIS — Z93.2 ILEOSTOMY PRESENT: ICD-10-CM

## 2023-07-07 DIAGNOSIS — R93.89 ABNORMAL FINDINGS ON DIAGNOSTIC IMAGING OF OTHER SPECIFIED BODY STRUCTURES: ICD-10-CM

## 2023-07-07 DIAGNOSIS — Z13.220 ENCOUNTER FOR LIPID SCREENING FOR CARDIOVASCULAR DISEASE: ICD-10-CM

## 2023-07-07 DIAGNOSIS — Z13.1 DIABETES MELLITUS SCREENING: ICD-10-CM

## 2023-07-07 DIAGNOSIS — Z13.6 ENCOUNTER FOR LIPID SCREENING FOR CARDIOVASCULAR DISEASE: ICD-10-CM

## 2023-07-07 PROCEDURE — 99214 OFFICE O/P EST MOD 30 MIN: CPT | Mod: ,,, | Performed by: FAMILY MEDICINE

## 2023-07-07 PROCEDURE — 1159F MED LIST DOCD IN RCRD: CPT | Mod: CPTII,,, | Performed by: FAMILY MEDICINE

## 2023-07-07 PROCEDURE — 99214 PR OFFICE/OUTPT VISIT, EST, LEVL IV, 30-39 MIN: ICD-10-PCS | Mod: ,,, | Performed by: FAMILY MEDICINE

## 2023-07-07 PROCEDURE — 3008F PR BODY MASS INDEX (BMI) DOCUMENTED: ICD-10-PCS | Mod: CPTII,,, | Performed by: FAMILY MEDICINE

## 2023-07-07 PROCEDURE — 3078F PR MOST RECENT DIASTOLIC BLOOD PRESSURE < 80 MM HG: ICD-10-PCS | Mod: CPTII,,, | Performed by: FAMILY MEDICINE

## 2023-07-07 PROCEDURE — 3074F SYST BP LT 130 MM HG: CPT | Mod: CPTII,,, | Performed by: FAMILY MEDICINE

## 2023-07-07 PROCEDURE — 3008F BODY MASS INDEX DOCD: CPT | Mod: CPTII,,, | Performed by: FAMILY MEDICINE

## 2023-07-07 PROCEDURE — 3074F PR MOST RECENT SYSTOLIC BLOOD PRESSURE < 130 MM HG: ICD-10-PCS | Mod: CPTII,,, | Performed by: FAMILY MEDICINE

## 2023-07-07 PROCEDURE — 1159F PR MEDICATION LIST DOCUMENTED IN MEDICAL RECORD: ICD-10-PCS | Mod: CPTII,,, | Performed by: FAMILY MEDICINE

## 2023-07-07 PROCEDURE — 3078F DIAST BP <80 MM HG: CPT | Mod: CPTII,,, | Performed by: FAMILY MEDICINE

## 2023-07-07 RX ORDER — OMEPRAZOLE 20 MG/1
20 CAPSULE, DELAYED RELEASE ORAL DAILY
Qty: 90 CAPSULE | Refills: 3 | Status: SHIPPED | OUTPATIENT
Start: 2023-07-07

## 2023-07-07 RX ORDER — ALBUTEROL SULFATE 90 UG/1
2 AEROSOL, METERED RESPIRATORY (INHALATION) EVERY 6 HOURS PRN
Qty: 18 G | Refills: 3 | Status: SHIPPED | OUTPATIENT
Start: 2023-07-07 | End: 2024-07-06

## 2023-07-07 RX ORDER — PROMETHAZINE HYDROCHLORIDE 25 MG/1
25 TABLET ORAL EVERY 4 HOURS PRN
Qty: 90 TABLET | Refills: 2 | Status: SHIPPED | OUTPATIENT
Start: 2023-07-07

## 2023-07-07 NOTE — PROGRESS NOTES
Subjective:       Patient ID: Bruno Ryan is a 62 y.o. male.    Chief Complaint: Follow-up and Medication Refill    Mr. Ryan is a 62-year-old male who is here today for his routine six-month follow-up.  He is not been seen since November of 2022, and he did not returned to do his routine maintenance labs.  I impressed upon him the significance of doing these labs to make sure we keep up on his routine medical care.  He did request a CT chest for long-term tobacco use.  He does have a history of nodules that are being followed so we will schedule his CT chest.  He did have a CT chest scheduled for last fall however he did not make that appointment.  He needs his promethazine, Prilosec and albuterol inhaler refilled.  These medications go to [facility].  He also needs his ileostomy supplies for 2-1/4 inch flange sent to [facility].  Mr. Montgomery has a history of Crohn's disease which required an ileostomy in 1994.  After multiple surgical interventions.  He is currently in pain management for chronic degenerative spine.  He is chronic neck pain and lumbar issues.  Overall he is doing well and has no complaints today    Follow-up  Pertinent negatives include no abdominal pain, chest pain, chills, congestion, coughing, diaphoresis, fever, nausea, rash or sore throat.   Medication Refill  Pertinent negatives include no abdominal pain, chest pain, chills, congestion, coughing, diaphoresis, fever, nausea, rash or sore throat.   Review of Systems   Constitutional:  Negative for activity change, appetite change, chills, diaphoresis and fever.   HENT:  Negative for congestion, ear pain, postnasal drip, rhinorrhea and sore throat.    Eyes:  Negative for pain, discharge, redness and itching.   Respiratory:  Negative for cough and shortness of breath.    Cardiovascular:  Negative for chest pain, palpitations and leg swelling.   Gastrointestinal:  Negative for abdominal distention, abdominal pain, constipation,  diarrhea and nausea.        Ileostomy since 1994   Genitourinary:  Negative for difficulty urinating, dysuria, frequency and urgency.   Skin:  Negative for color change, rash and wound.   All other systems reviewed and are negative.    Patient Active Problem List   Diagnosis    Crohn's disease of colon with complication    GERD (gastroesophageal reflux disease)    Other insomnia    Chronic neck pain    Hiatal hernia    History of ileostomy    S/P splenectomy    Shortness of breath    Pulmonary nodule       Objective:      Physical Exam  Vitals and nursing note reviewed.   Constitutional:       Appearance: Normal appearance. He is normal weight.   HENT:      Head: Normocephalic.      Nose: Nose normal.   Eyes:      Extraocular Movements: Extraocular movements intact.      Conjunctiva/sclera: Conjunctivae normal.      Pupils: Pupils are equal, round, and reactive to light.   Cardiovascular:      Rate and Rhythm: Normal rate and regular rhythm.      Heart sounds: Normal heart sounds. No murmur heard.  Pulmonary:      Effort: Pulmonary effort is normal. No respiratory distress.      Breath sounds: Normal breath sounds.   Abdominal:      Comments: Ileostomy since 1994   Musculoskeletal:         General: Normal range of motion.      Cervical back: Normal range of motion and neck supple.   Skin:     General: Skin is warm and dry.   Neurological:      General: No focal deficit present.      Mental Status: He is alert and oriented to person, place, and time.   Psychiatric:         Mood and Affect: Mood normal.         Behavior: Behavior normal.       No results found for: WBC, HGB, HCT, PLT, CHOL, TRIG, HDL, LDLDIRECT, ALT, AST, NA, K, CL, CREATININE, BUN, CO2, TSH, PSA, INR, GLUF, HGBA1C, MICROALBUR  The ASCVD Risk score (Screven DK, et al., 2019) failed to calculate for the following reasons:    Cannot find a previous HDL lab    Cannot find a previous total cholesterol lab  Visit Vitals  /78 (BP Location: Left arm,  "Patient Position: Sitting, BP Method: Medium (Manual))   Pulse 84   Temp 97.9 °F (36.6 °C) (Oral)   Resp 12   Ht 6' 2" (1.88 m)   Wt 60.2 kg (132 lb 11.5 oz)   SpO2 96%   BMI 17.04 kg/m²      Assessment:       1. Solitary pulmonary nodule    2. Shortness of breath    3. Tobacco use    4. Smokes less than 1 pack a day with greater than 40 pack year history    5. Gastroesophageal reflux disease without esophagitis    6. Nausea    7. Ileostomy present    8. Encounter for medical examination to establish care    9. Thyroid disorder screen    10. Diabetes mellitus screening    11. Screening for prostate cancer    12. Encounter for lipid screening for cardiovascular disease    13. Abnormal finding of blood chemistry, unspecified    14. Abnormal findings on diagnostic imaging of other specified body structures        Plan:       1. Solitary pulmonary nodule  -     CT Chest Without Contrast; Future; Expected date: 07/07/2023    2. Shortness of breath  -     albuterol (VENTOLIN HFA) 90 mcg/actuation inhaler; Inhale 2 puffs into the lungs every 6 (six) hours as needed for Wheezing. Rescue  Dispense: 18 g; Refill: 3    3. Tobacco use  -     albuterol (VENTOLIN HFA) 90 mcg/actuation inhaler; Inhale 2 puffs into the lungs every 6 (six) hours as needed for Wheezing. Rescue  Dispense: 18 g; Refill: 3    4. Smokes less than 1 pack a day with greater than 40 pack year history  -     albuterol (VENTOLIN HFA) 90 mcg/actuation inhaler; Inhale 2 puffs into the lungs every 6 (six) hours as needed for Wheezing. Rescue  Dispense: 18 g; Refill: 3    5. Gastroesophageal reflux disease without esophagitis  -     omeprazole (PRILOSEC) 20 MG capsule; Take 1 capsule (20 mg total) by mouth once daily.  Dispense: 90 capsule; Refill: 3    6. Nausea  -     promethazine (PHENERGAN) 25 MG tablet; Take 1 tablet (25 mg total) by mouth every 4 (four) hours as needed for Nausea.  Dispense: 90 tablet; Refill: 2    7. Ileostomy present  -     OSTOMY " SUPPLIES FOR HOME USE    8. Encounter for medical examination to establish care  -     Lipid Panel; Future; Expected date: 07/07/2023  -     CBC Auto Differential; Future; Expected date: 07/07/2023  -     Comprehensive Metabolic Panel; Future; Expected date: 07/07/2023  -     Hemoglobin A1C; Future; Expected date: 07/07/2023  -     TSH; Future; Expected date: 07/07/2023  -     PSA, Screening; Future; Expected date: 07/07/2023  -     Microalbumin/Creatinine Ratio, Urine; Future; Expected date: 07/07/2023    9. Thyroid disorder screen  -     TSH; Future; Expected date: 07/07/2023    10. Diabetes mellitus screening  -     Hemoglobin A1C; Future; Expected date: 07/07/2023    11. Screening for prostate cancer  -     PSA, Screening; Future; Expected date: 07/07/2023    12. Encounter for lipid screening for cardiovascular disease  -     Lipid Panel; Future; Expected date: 07/07/2023    13. Abnormal finding of blood chemistry, unspecified  -     CBC Auto Differential; Future; Expected date: 07/07/2023  -     Hemoglobin A1C; Future; Expected date: 07/07/2023    14. Abnormal findings on diagnostic imaging of other specified body structures  -     TSH; Future; Expected date: 07/07/2023       No follow-ups on file.      Future Appointments       Date Specialty Appt Notes    7/12/2023 Lab .    7/12/2023 Lab .

## 2023-07-07 NOTE — PROGRESS NOTES
Need ostomy supplies to [facility], 2 1/4 size ileostomy, need new CT scan order for lung nodule

## 2024-03-12 ENCOUNTER — PATIENT MESSAGE (OUTPATIENT)
Dept: FAMILY MEDICINE | Facility: CLINIC | Age: 64
End: 2024-03-12
Payer: MEDICARE

## 2024-07-09 ENCOUNTER — PATIENT MESSAGE (OUTPATIENT)
Dept: ADMINISTRATIVE | Facility: HOSPITAL | Age: 64
End: 2024-07-09
Payer: MEDICARE

## 2024-09-12 ENCOUNTER — OFFICE VISIT (OUTPATIENT)
Dept: URGENT CARE | Facility: CLINIC | Age: 64
End: 2024-09-12
Payer: MEDICARE

## 2024-09-12 VITALS
HEART RATE: 81 BPM | BODY MASS INDEX: 17.97 KG/M2 | SYSTOLIC BLOOD PRESSURE: 131 MMHG | TEMPERATURE: 98 F | WEIGHT: 140 LBS | HEIGHT: 74 IN | OXYGEN SATURATION: 97 % | RESPIRATION RATE: 18 BRPM | DIASTOLIC BLOOD PRESSURE: 89 MMHG

## 2024-09-12 DIAGNOSIS — Z76.0 MEDICATION REFILL: Primary | ICD-10-CM

## 2024-09-12 NOTE — PROGRESS NOTES
"Subjective:      Patient ID: Bruno Ryan is a 64 y.o. male.    Vitals:  height is 6' 2" (1.88 m) and weight is 63.5 kg (140 lb). His temperature is 98.1 °F (36.7 °C). His blood pressure is 131/89 and his pulse is 81. His respiration is 18 and oxygen saturation is 97%.     Chief Complaint: Medication Refill    Pt is being seen for refill on rx for ileostomy bag.     Medication Refill  This is a new problem.     ROS   Objective:     Physical Exam   Constitutional: He is oriented to person, place, and time. He appears well-developed.   HENT:   Head: Normocephalic and atraumatic.   Ears:   Right Ear: External ear normal.   Left Ear: External ear normal.   Nose: Nose normal.   Mouth/Throat: Mucous membranes are normal.   Eyes: Conjunctivae and lids are normal.   Neck: Trachea normal. Neck supple.   Cardiovascular: Normal rate, regular rhythm and normal heart sounds.   Pulmonary/Chest: Effort normal and breath sounds normal. No respiratory distress.   Abdominal: Normal appearance and bowel sounds are normal. He exhibits no distension and no mass. Soft. There is no abdominal tenderness.      Comments: Ileostomy   Musculoskeletal: Normal range of motion.         General: Normal range of motion.   Neurological: He is alert and oriented to person, place, and time. He has normal strength.   Skin: Skin is warm, dry, intact, not diaphoretic and not pale.   Psychiatric: His speech is normal and behavior is normal. Judgment and thought content normal.   Nursing note and vitals reviewed.      Assessment:     1. Medication refill        Plan:       Medication refill  -     OSTOMY SUPPLIES FOR HOME USE           Refill for ostomy supplies sent to DME of choice.  - To ED for any new or acutely worsening symptoms including but not limited to chest pain, palpitations, shortness of breath, or fever greater than 103° F.  Patient in agreement with plan of care.    - The diagnosis, treatment plan, instructions for follow-up and " reevaluation as well as ED precautions were discussed and understanding was verbalized. All questions or concerns have been addressed.  -Follow up with your primary care provider for continued evaluation and management.